# Patient Record
(demographics unavailable — no encounter records)

---

## 2017-09-04 NOTE — PHYS DOC
Adult General


HPI


HPI





Patient is a 76-year-old male presents with complaints of injury and swelling 

to the base of the fourth finger of the left hand at the dorsal side only. No 

fevers no chills no nausea no vomiting no diarrhea. Patient has no other 

complaints. Patient states the swelling and redness have been getting worse so 

he decided to come over and get checked out.





Review of Systems


Review of Systems





Constitutional: Denies fever or chills []





HENT: Denies nasal congestion or sore throat []


Respiratory: Denies  shortness of breath []


Cardiovascular: No chest pain


GI: Denies abdominal pain, nausea, vomitingor diarrhea []





Musculoskeletal: Denies back pain or joint pain []


Integument: Swelling and erythema with skin breakdown dorsal aspect of left 

fourth finger


Neurologic: Denies headache, focal weakness or sensory changes []





Allergies


Allergies





Allergies








Coded Allergies Type Severity Reaction Last Updated Verified


 


  sulfamethoxazole Allergy Mild  1/15/15 Yes


 


  trimethoprim Allergy Mild  1/15/15 Yes











Physical Exam


Physical Exam





Constitutional: Well developed, well nourished, no acute distress, non-toxic 

appearance. []


HENT: Normocephalic, atraumatic


Eyes: EOMI, conjunctiva normal, no discharge. [] 


Neck: Normal range of motion, trachea midline no stridor. [] 


Cardiovascular: Normal perfusion, capillary refill less than 2 seconds


Lungs & Thorax: Tachypnea


Abdomen: Distention


Skin: Warm and with surrounding cellulitis at the dorsal aspect of pain base of 

the left fourth finger, area of cellulitis is no circumferential. 


Back: Normal range of motion


Extremities: No tenderness, no cyanosis, no clubbing, ROM intact, no edema. 

Knavel signs negative 4 at finger 4.


Neurologic: Alert and oriented X 3, normal motor function, no focal deficits 

noted. []


Psychologic: Affect normal, judgement normal, mood normal. []





EKG


EKG


[]





Radiology/Procedures


Radiology/Procedures


[]





Course & Med Decision Making


Course & Med Decision Making


Pertinent Labs and Imaging studies reviewed. (See chart for details)





[]





Dragon Disclaimer


Dragon Disclaimer


This chart was dictated in whole or in part using Voice Recognition software in 

a busy, high-work load, and often noisy Emergency Department environment.  It 

may contain unintended and wholly unrecognized errors or omissions.





Departure


Departure:


Impression:  


 Primary Impression:  


 Cellulitis of finger of left hand


Disposition:  01 HOME, SELF-CARE


Condition:  STABLE


Patient Instructions:  Cellulitis





Additional Instructions:  


It is very important that you have a recheck in 2 days with your doctor, if 

your doctor is not available return to the ED for recheck and reevaluation.


Scripts


Hydrocodone Bit/Acetaminophen (NORCO 5-325 TABLET) 1 Each Tablet


1 TAB PO TID for 3 Days, #9 TAB


   Prov: NATE MUÑOZ MD         9/4/17 


Cephalexin (KEFLEX) 250 Mg Capsule


2 CAP PO TID for 10 Days, #60 CAP


   Prov: NATE MUÑOZ MD         9/4/17











NATE MUÑOZ MD Sep 4, 2017 14:48

## 2017-09-06 NOTE — PHYS DOC
General


Chief Complaint:  WOUND CHECK


Stated Complaint:  RECHECK OF SPIDER BITE


Time Seen by MD:  15:26


Source:  patient, old records


Exam Limitations:  no limitations


Problems:  





History of Present Illness


Initial Comments


Patient is a 76-year-old male returning to the emergency department for a wound 

check.


Patient was seen here on September 4 (2 days ago) with left fourth finger and 

dorsal hand cellulitis. On that visit patient's vitals were normal and the 

patient was discharged home on cephalexin. Patient states he does not the pain 

medication filled.


Since starting the Keflex patient states that the swelling and redness which 

had consumed nearly all the dorsal aspect of his left hand has resolved. There 

is a moderate sized abscess localized to the dorsal aspect of the proximal left 

fourth phalanx which the patient states has not resolved. He reports improved 

range of motion he denies any discomfort proximal to the fourth 

metacarpophalangeal joint either at rest or with active/passive range of 

motion. He denies fever chills sweats or myalgias. Vital signs are normal upon 

ED arrival. He is very upfront during our initial discussion he does not intend 

to see any type of hand surgeon. He states he's had multiple hand injuries in 

the past which she was told would require hand specialty involvement however he 

feels they heal appropriately on their own and is very proud of the fact that 

he has never been on any type of medication he states his whole life. Tetanus 

status is not up-to-date and will be done today. He denies any other new 

symptoms or complaints, he admits that on multiple occasions he has tried to 

drain his finger using a straight needle that home and even used a shot that at 

one point to try to suck the contents of the abscess out. He has applied table 

salt and other various home remedies which likely have made his condition 

worse. In the emergency department the patient is afebrile and not tachycardic 

he does not appear to be toxic


Onset:  other


Severity:  moderate


Pain/Injury Location:  left 4th finger


Method of Injury:  unknown


Modifying Factors:  worse with jarring, worse with movement


Allergies:  


Coded Allergies:  


     sulfamethoxazole (Verified  Allergy, Mild, 1/15/15)


     trimethoprim (Verified  Allergy, Mild, 1/15/15)





Past Medical History


Medical History:  no pertinent history


Surgical History:  noncontributory





Social History


Smoker:  non-smoker


Alcohol:  other (drinks beer daily)


Drugs:  none





Review of Systems


Constitutional:  denies chills, denies diaphoresis, denies fever, denies malaise


Respiratory:  denies cough, denies shortness of breath


Cardiovascular:  denies chest pain, denies palpitations


Gastrointestinal:  denies nausea, denies vomiting


Musculoskeletal:  see HPI


Skin:  see HPI





Physical Exam


General Appearance:  WD/WN, no apparent distress


Neck:  non-tender, supple


Cardiovascular/Respiratory:  normal peripheral pulses, normal breath sounds


Back:  no CVA tenderness, no vertebral tenderness


Hand:  swelling (left fourth finger: There is swelling at the dorsal aspect of 

the proximal phalanx with drain in place, appears to be congealed/solidified 

purulence acting as a plug. There is minimal surrounding erythema it does not 

extend anteriorly and there is very little tenderness. There is no tenderness 

proximal to the fourth MCP, no tendon discomfort anteriorly or posteriorly with 

active and passive  restricted or not restricted. No sensory deficits, no motor 

deficits other than an apparent mechanical obstruction to full flexion of the 

finger due to the dorsal abscess/mass. There are no ischemic changes or other 

findings consistent with brown recluse bite)


Neurologic/Tendon:  normal sensation, normal motor functions, normal tendon 

functions, responds to pain, no evidence tendon injury


Psychiatric:  alert, oriented x 3


Skin:  warm/dry (left fourth finger as above)





Incision and Drainage


Incision and Drainage :  


   Site:  dorsal aspect of the left proximal fourth phalanx


   Blade Size:  11


   I & D Procedure:  betadine prep, sterile dressing applied


Progress


I discussed indication for and surgeon referral patient refuses.


Informed consent obtained, risks and benefits discussed and questions answered. 

Patient understands that he can lose the use of this digit and the infection 

could seed his blood, sepsis/death are possible risks. Analgesia achieved with 

direct infiltration with 2% lidocaine without epinephrine. Caloric lip utilized 

for blunt dissection of the already present drain to break up a congealed 

purulence. The solid purulent material was mechanically removed and a small 

amount of liquid purulent material was expressed. No subcutaneous cavity 

present requiring packing with iodoform. Patient has full flexion of the digit 

now that the mechanical obstruction has been removed. The area was irrigated 

initially with Betadine and flushed with copious amounts of normal saline 

estimated volume 150 mL. Bactroban ointment and sterile nonstick dressing 

applied, the digit was neurovascularly intact after application of the 

dressing. Wound care instructions discussed the patient expressed agreement and 

understanding of same. Instructions are located in written form and departure.





Departure


Time of Disposition:  16:36


Disposition:  01 HOME, SELF-CARE


Diagnosis:  left 4th finger abscess with I/D


Condition:  IMPROVED


Patient Instructions:  Abscess, Care After, MRSA Overview





Additional Instructions:  


No use of left hand/fingers until cleared by your doctor.





Keep wound covered with sterile dressing until healed completely.


Wash wound by itself twice daily with soap and warm water, blot dry.


Change dressing and apply bactroban ointment after each wash.


Allow wound to air dry one hour daily.





Discontinue cephalexin


Rx:  doxycycline, bactroban ointment (MRSA coverage added)


Take meds with food.


Cultured yogurt daily while taking antibiotics (gogurt, danimals, etc).





Follow up with your doctor Friday for wound check.  May ultimately need to see 

hand surgery specialist.


Return to ED with new or changing symptoms.











NY ACOSTA DO Sep 6, 2017 16:40